# Patient Record
Sex: FEMALE | Race: ASIAN | NOT HISPANIC OR LATINO | URBAN - METROPOLITAN AREA
[De-identification: names, ages, dates, MRNs, and addresses within clinical notes are randomized per-mention and may not be internally consistent; named-entity substitution may affect disease eponyms.]

---

## 2023-10-27 ENCOUNTER — EMERGENCY (EMERGENCY)
Facility: HOSPITAL | Age: 33
LOS: 1 days | Discharge: HOME | End: 2023-10-27
Attending: STUDENT IN AN ORGANIZED HEALTH CARE EDUCATION/TRAINING PROGRAM | Admitting: STUDENT IN AN ORGANIZED HEALTH CARE EDUCATION/TRAINING PROGRAM
Payer: COMMERCIAL

## 2023-10-27 ENCOUNTER — EMERGENCY (EMERGENCY)
Facility: HOSPITAL | Age: 33
LOS: 1 days | Discharge: HOME | End: 2023-10-27
Admitting: EMERGENCY MEDICINE
Payer: COMMERCIAL

## 2023-10-27 VITALS
SYSTOLIC BLOOD PRESSURE: 96 MMHG | HEART RATE: 74 BPM | RESPIRATION RATE: 17 BRPM | DIASTOLIC BLOOD PRESSURE: 60 MMHG | TEMPERATURE: 99 F | HEIGHT: 63 IN | WEIGHT: 117.95 LBS | OXYGEN SATURATION: 95 %

## 2023-10-27 VITALS
DIASTOLIC BLOOD PRESSURE: 57 MMHG | HEART RATE: 57 BPM | OXYGEN SATURATION: 100 % | WEIGHT: 117.95 LBS | SYSTOLIC BLOOD PRESSURE: 92 MMHG | TEMPERATURE: 98 F | RESPIRATION RATE: 18 BRPM | HEIGHT: 63 IN

## 2023-10-27 DIAGNOSIS — R09.A2 FOREIGN BODY SENSATION, THROAT: ICD-10-CM

## 2023-10-27 PROCEDURE — 99284 EMERGENCY DEPT VISIT MOD MDM: CPT

## 2023-10-27 PROCEDURE — 99283 EMERGENCY DEPT VISIT LOW MDM: CPT

## 2023-10-27 RX ORDER — KETOROLAC TROMETHAMINE 30 MG/ML
30 SYRINGE (ML) INJECTION ONCE
Refills: 0 | Status: DISCONTINUED | OUTPATIENT
Start: 2023-10-27 | End: 2023-10-27

## 2023-10-27 RX ORDER — DEXAMETHASONE 0.5 MG/5ML
6 ELIXIR ORAL ONCE
Refills: 0 | Status: COMPLETED | OUTPATIENT
Start: 2023-10-27 | End: 2023-10-27

## 2023-10-27 RX ORDER — DEXAMETHASONE 0.5 MG/5ML
6 ELIXIR ORAL ONCE
Refills: 0 | Status: DISCONTINUED | OUTPATIENT
Start: 2023-10-27 | End: 2023-10-27

## 2023-10-27 NOTE — ED PROVIDER NOTE - PHYSICAL EXAMINATION
VITAL SIGNS: I have reviewed nursing notes and confirm.  CONSTITUTIONAL: Well-developed; well-nourished; in no acute distress.  SKIN: Skin is warm and dry, no acute rash.  HEAD: Normocephalic; atraumatic.  NECK: Supple; non tender.  ENT: Clear oropharynx; no posterior erythema or obvious foreign body/obstruction.  Normal appearance of tonsils and uvula midline.  CARD: S1, S2 normal; no murmurs, gallops, or rubs. Regular rate and rhythm.  RESP: No wheezes, rales or rhonchi.  EXT: Normal ROM. No clubbing, cyanosis or edema.  NEURO: Alert, oriented. Grossly unremarkable. SOLITARIO, normal tone, no gross motor or sensory changes. Fluent speech.   PSYCH: Cooperative, appropriate. Mood and affect wnl.

## 2023-10-27 NOTE — ED ADULT NURSE NOTE - OBJECTIVE STATEMENT
Pt reports globus/foreign body sensation in throat exacerbated when swallowing. Pt able to handle secretions and airway without incident. No respiratory distress noted. No fevers or chills noted. No chest pain/SOB reported. Pt alert, oriented, and ambulatory at time of assessment. Pt able to speak in full sentences without difficulty.

## 2023-10-27 NOTE — ED ADULT TRIAGE NOTE - BSA (M2)
CONSERVATIVE TREATMENT FOR PEDIATRIC URI (VIRAL):   PLEASE DOUBLE CHECK WITH PEDIATRICIAN TO ENSURE THAT ALL BELOW SUGGESTING MEDICATIONS OR SAFE FOR YOUR CHILD.  REFER TO MEDICATION LABELING FOR CORRECT DOSAGE    Using a humidifier and propping your child up will help him/her with symptom relief.     You can give Children's Zyrtec or children's Claritin or Children's Benadryl or Childrens xyzal once daily to help with cough and runny nose.    You can give Children's Mucinex or Children's Robitussin or Children's Delsym for cough and chest congestion.     Your child can use Flonase or nasal saline spray to clear nasal drainage and help with nasal congestion.     You can place a thin layer of Vicks vapor rub of the the soles of the feet and place on socks to help with congestion.  You can also apply a little over the chest.  Please avoid placing Vicks on the face as it is too strong for your child's facial area.    Monitor your child's temperature and ALTERNATE Tylenol every 4 hours and/or Ibuprofen (Motrin) every 6-8 hours as needed for fever (100.4F or greater), headache and/or body aches.     Make sure your child is drinking plenty fluids and getting plenty of rest.    You should follow-up with your child's pediatrician.    Go to the ER if your child's fever is not controlled with Tylenol and/or Ibuprofen, or for any further worsening or concerning symptoms such as but not limited to:  Not making urine, not able to make with ears, or severe inconsolability.          1.55

## 2023-10-27 NOTE — ED PROVIDER NOTE - CLINICAL SUMMARY MEDICAL DECISION MAKING FREE TEXT BOX
33-year-old female, denies significant past medical history, presenting to the emergency room complaining of foreign body sensation in the back of her throat after eating bread earlier today. Upon my examination there is no obvious abnormality noted.  Clinically I suspect patient is experiencing a globus sensation, likely due to irritation in the back of the throat when a piece of seed passed to the area.  I cannot fully evaluate the back of the patient's throat; she will require a laryngoscope in order to definitively rule out a foreign body. I explained to patient I can transfer her up to [facility] for her to get an ENT evaluation at this time.  Patient is agreeable but prefers to take a cab there herself.  She is currently stable at this time with stable vitals from triage.  She is offered Decadron and Toradol for symptomatic relief, however patient prefers to wait until she gets there before receiving it.  She is otherwise stable and well-appearing.  She is safe for discharge and will head to the [facility] now for further evaluation.

## 2023-10-27 NOTE — ED PROVIDER NOTE - PATIENT PORTAL LINK FT
You can access the FollowMyHealth Patient Portal offered by [facility] by registering at the following website: [email or web address]. By joining [employer]’s FollowMyHealth portal, you will also be able to view your health information using other applications (apps) compatible with our system.

## 2023-10-27 NOTE — ED PROVIDER NOTE - CARE PROVIDER_API CALL
Porsche Thomas  Otolaryngology  [address] [place] [address], [address]  [place] [address]  Phone: [phone]  Fax: [phone]  Follow Up Time: Urgent

## 2023-10-27 NOTE — ED PROVIDER NOTE - PATIENT PORTAL LINK FT
You can access the FollowMyHealth Patient Portal offered by [facility] by registering at the following website: [email or web address]. By joining [employer]’s FollowMyHealth portal, you will also be able to view your health information using other applications (apps) compatible with our system.

## 2023-10-27 NOTE — ED PROVIDER NOTE - NSFOLLOWUPINSTRUCTIONS_ED_ALL_ED_FT
You were seen in the Emergency Department for: foreign body sensation    Please follow up with your primary physician. If you do not have a primary physician or specialist of your needs, please call [phone] to find one convenient for you. At this number you will be able to locate a provider who accepts your insurance, as well as locate the right specialist for your needs.    You should return to the Emergency Department if you feel any new/worsening/persistent symptoms including but not limited to: chest pain, difficulty breathing, loss of consciousness, bleeding, uncontrolled pain, numbness/weakness of a body part

## 2023-10-27 NOTE — ED PROVIDER NOTE - NS ED ROS FT
+throat FB sensation  Denies fevers, chills, nausea, vomiting, diarrhea, constipation, abdominal pain, urinary symptoms, chest pain, palpitations, shortness of breath, dyspnea on exertion, syncope/near syncope, cough/URI symptoms, headache, weakness, numbness, focal deficits, visual changes, gait or balance changes, dizziness

## 2023-10-27 NOTE — ED PROVIDER NOTE - NSFOLLOWUPINSTRUCTIONS_ED_ALL_ED_FT
Globus Pharyngeus  Globus pharyngeus is a condition that makes a person feel like there is a lump in their throat. It may also feel like there is something stuck in the front of the throat. It does not cause pain. It also does not make it harder to swallow food or liquid. Your health care provider may not see any changes to your throat during an exam.    Globus pharyngeus may come and go but may last a long time when it occurs. In most cases, it does not require treatment.    It may be caused by:  Gastroesophageal reflux. This is when juices from the stomach flow back up into the throat.  Neuralgia. This is irritation of the nerves that help you swallow.  Anxiety, grief, or depression.  Irritation from cigarettes, alcohol, or drinks with caffeine.  Your health care provider will do exams and tests to look for a cause and to rule out other problems.    Follow these instructions at home:  A person practicing relaxation techniques.  Watch for any changes. To help with your condition:  Take over-the-counter and prescription medicines only as told by your health care provider.  Follow instructions from your health care provider about what you may eat and drink. You may need to reduce the amount of alcohol and caffeine that you take in.  Avoid stress. Do relaxation exercises as told by your health care provider.  Do not use any products that contain nicotine or tobacco. These products include cigarettes, chewing tobacco, and vaping devices, such as e-cigarettes. If you need help quitting, ask your health care provider.  It is up to you to get the results of any tests that were done. Ask your health care provider, or the department that is doing the tests, when your results will be ready.  Contact a health care provider if:  Your symptoms get worse.  You have throat pain.  It is hard to swallow.  Food or liquid comes back up into your mouth.  You lose weight without trying.  Get help right away if:  Your throat swells.  This information is not intended to replace advice given to you by your health care provider. Make sure you discuss any questions you have with your health care provider.

## 2023-10-27 NOTE — ED ADULT NURSE NOTE - NS ED NURSE LEVEL OF CONSCIOUSNESS SPEECH
Spoke with patient, reviewed culture results and Dr Jimenez's recommendations. Patient understands.    Speaking Coherently

## 2023-10-27 NOTE — ED ADULT NURSE NOTE - OBJECTIVE STATEMENT
34 y/o F here with "small sprouted seed got stuck in my throat after eating bread". She states she is unable to remove it due to gag reflex. NKA. A&Ox4. Ambulatory. No N/V/D

## 2023-10-27 NOTE — ED ADULT NURSE NOTE - CHIEF COMPLAINT QUOTE
Pt presents to ED fro foreign body in the throat. Pt was seen at [facility]. Pt reports she ate a bread at 1400. Pt A&Ox4, conversive in full sentences and denies any difficulty swallowing and no drooling noted. No signs of distress in triage.

## 2023-10-27 NOTE — ED PROVIDER NOTE - CLINICAL SUMMARY MEDICAL DECISION MAKING FREE TEXT BOX
33 year old otherwise healthy female presenting for foreign body sensation in throat x 1d. Overall is nontoxic here, protecting her airway and tolerating secretions with ease. Lungs/oropharynx clear on my exam. Will consult ENT to further evaluate.

## 2023-10-27 NOTE — ED PROVIDER NOTE - PHYSICAL EXAMINATION
Gen - NAD; well-appearing, speaking in normal voice; A+Ox3   HEENT - NCAT, EOMI, moist mucous membranes, clear oropharynx without appreciable foreign body in posterior oropharynx, midline uvula, no exudates/lesions  Neck - supple  Resp - CTAB, no increased WOB  CV -  RRR, no m/r/g  Abd - soft, NT, ND; no guarding or rebound  MSK - FROM of b/l UE and LE, no gross deformities  Extrem - no LE edema/erythema/tenderness  Neuro - no focal motor or sensation deficits  Skin - warm, well perfused

## 2023-10-27 NOTE — CONSULT NOTE ADULT - ASSESSMENT
-------------------------------  ASSESSMENT/PLAN:    IMPRESSION: TATYANA JEAN-BAPTISTE  is a 33y Female with no significant PMH who presents with foreign body sensation after eating bread. She is AFVSS, tolerating secretions, with no foreign body visualized on physical exam or laryngoscopy. Small scratch visualized on anterior tonsillar pillar, possible etiology of discomfort.     RECOMMENDATIONS:  - Famotidine once a day at night for discomfort  - Can follow up with Dr. Thomas next week for further evaluation    ---  Thank you for the kind referral and for allowing me to share in the care of TATYANA JEAN-BAPTISTE If you have any questions, please do not hesitate to contact me.     Sincerely,  Gloria Alvarez  10-27-23 @ 21:24       -------------------------------  ASSESSMENT/PLAN:    IMPRESSION: TATYANA JEAN-BAPTISTE  is a 33y Female with no significant PMH who presents with foreign body sensation after eating bread. She is AFVSS, tolerating secretions, without respiratory distress and with no foreign body visualized on physical exam or laryngoscopy. Small scratch visualized on anterior tonsillar pillar, possible etiology of discomfort.     RECOMMENDATIONS:  - Famotidine once a day at night for discomfort  - Can follow up with Dr. Thomas next week for further evaluation  - Discharge per emergency department    ---  Thank you for the kind referral and for allowing me to share in the care of TATYANA JEAN-BAPTISTE If you have any questions, please do not hesitate to contact me.     Sincerely,  Gloria Alvarez  10-27-23 @ 21:24

## 2023-10-27 NOTE — ED PROVIDER NOTE - PROGRESS NOTE DETAILS
Leo Wilson MD: Patient seen by ENT--no foreign body appreciated. Does not need imaging here, can f/u outpatient. Patient stable--tolerating her secretions, protecting airway, no respiratory complaints. Will DC with outpatient f/u.

## 2023-10-27 NOTE — ED ADULT NURSE NOTE - CAS ELECT INFOMATION PROVIDED
Pt left prior to accepting discharge paperwork. Pt refused final vital signs. Pt alert, oriented, ambulatory, and handling airway/secretions without incident at time of departure.

## 2023-10-27 NOTE — ED ADULT TRIAGE NOTE - CHIEF COMPLAINT QUOTE
Pt presents to ED fro foreign body in the throat. Pt reports she ate a bread at 1400. Pt A&Ox4, conversive in full sentences and denies any difficulty swallowing and no drooling noted. No signs of distress in triage. Pt presents to ED fro foreign body in the throat. Pt was seen at [facility]. Pt reports she ate a bread at 1400. Pt A&Ox4, conversive in full sentences and denies any difficulty swallowing and no drooling noted. No signs of distress in triage.

## 2023-10-27 NOTE — ED PROVIDER NOTE - CARE PLAN
1 Patient given discharge instructions and they have confirmed that they 
understand the instructions.  All patient belongings gathered and taken with 
patient. Patient ambulatory with steady gait from ED. Principal Discharge DX:	Foreign body sensation, throat

## 2023-10-27 NOTE — CONSULT NOTE ADULT - SUBJECTIVE AND OBJECTIVE BOX
OTOLARYNGOLOGY (ENT) CONSULTATION NOTE    PATIENT: TATYANA JEAN-BAPTISTE     MRN: [id number]       : 89  DATE OF ADMISSION:10-27-23  DATE OF SERVICE:  10-27-23 @ 21:24    CHIEF COMPLAINT: foreign body in throat    HISTORY OF PRESENT ILLNESS:  TATYANA JEAN-BAPTISTE  is a 33y Female who presents with complaints of foreign body in throat. She reports that she was eating bread around 2pm today when she started to feel something stuck in her throat. She reports feeling it in the back on the right side, near the tonsil. She was evaluated at [facility] earlier today and was discharged at that time with no foreign body visualized. She presents to [facility] ED as the sensation has not stopped. She denies difficulty swallowing, tolerating her own secretions, difficulty tolerating her diet. She denies difficulty breathing, fevers, chills.     PAST MEDICAL HISTORY:  No pertinent past medical history        CURRENT MEDICATIONS       HOME MEDICATIONS:      ALLERGIES:  No Known Allergies    SOCIAL HISTORY: Pertinent included in HPI   FAMILY HISTORY      SURGICAL HISTORY:  No significant past surgical history        REVIEW OF SYSTEMS: The patient was asked and responded to a review of systems regarding the following symptoms: constitutional, eye, ears, nose, mouth, throat, cardiovascular, respiratory. Pertinent factors have been included in the HPI.       General: NAD, A+Ox3  Respiratory: No respiratory distress, stridor, or stertor  Voice quality: normal  Face:  Symmetric without masses or lesions  OU: EOMI  Right: Pinna wnl  Left: Pinna wnl  Nose: nasal cavity clear bilaterally, inferior turbinates normal  OC/OP: tongue normal, floor of mouth WNL, no masses or lesions, OP clear, small pinpoint scratch on anterior tonsillar pillar, no foreign body visaulized  Neck: soft/flat, no LAD      LARYNGOSCOPY EXAM:     Verbal consent was obtained from patient prior to procedure.    Flexible laryngoscopy was performed and revealed the following:    Nasopharynx had no mass or exudate.    Base of tongue was symmetric and not enlarged.    Vallecula was clear    Epiglottis, both aryepiglottic folds and both false vocal folds were normal    Arytenoids both without edema and erythema     True vocal folds were fully mobile and without lesions.     Post cricoid area was clear    Interarytenoid edema was absent    No foreign body visualized    The patient tolerated the procedure well.        Vital Signs:  T(C): 36.7 (10-27-23 @ 19:50), Max: 37.1 (10-27-23 @ 16:11)  HR: 57 (10-27-23 @ 19:50) (57 - 74)  BP: 92/57 (10-27-23 @ 19:50) (92/57 - 96/60)  RR: 18 (10-27-23 @ 19:50) (17 - 18)  SpO2: 100% (10-27-23 @ 19:50) (95% - 100%)        [id number]    MICROBIOLOGY:      PATHOLOGY:

## 2023-10-27 NOTE — ED PROVIDER NOTE - OBJECTIVE STATEMENT
33-year-old female, denies significant past medical history, presenting to the emergency room complaining of foreign body sensation in the back of her throat after eating bread earlier today.  She reports eating whole-wheat bread with sprouted seeds on the top, earlier today.  After chewing and swallowing, she subsequently felt the sensation as though a small seed might of become lodged in the back of her throat.  Patient has been able to swallow like normal and denies any coughing, choking, or shortness of breath.  She further denies nausea, vomiting, fevers, chills, chest pain, or dizziness.

## 2023-10-27 NOTE — ED ADULT NURSE NOTE - NSFALLUNIVINTERV_ED_ALL_ED
Bed/Stretcher in lowest position, wheels locked, appropriate side rails in place/Call bell, personal items and telephone in reach/Instruct patient to call for assistance before getting out of bed/chair/stretcher/Non-slip footwear applied when patient is off stretcher/[place] to call system/Physically safe environment - no spills, clutter or unnecessary equipment/Purposeful proactive rounding/Room/bathroom lighting operational, light cord in reach

## 2023-10-31 DIAGNOSIS — R09.A2 FOREIGN BODY SENSATION, THROAT: ICD-10-CM

## 2024-01-18 NOTE — ED ADULT NURSE NOTE - SUICIDE SCREENING QUESTION 3
Pharmacy faxed in a request for prior authorization on:    Medication: oxybutynin  Dosage: 10 mg  Quantity requested:  90  Pharmacy for prescription has been selected.    Initiation of prior authorization needed.  Prior authorization request has been initiated and sent for completion.    No

## 2024-09-04 ENCOUNTER — EMERGENCY (EMERGENCY)
Facility: HOSPITAL | Age: 34
LOS: 1 days | Discharge: HOME | End: 2024-09-04
Admitting: EMERGENCY MEDICINE
Payer: COMMERCIAL

## 2024-09-04 VITALS
TEMPERATURE: 98 F | HEIGHT: 65 IN | WEIGHT: 134.48 LBS | RESPIRATION RATE: 15 BRPM | OXYGEN SATURATION: 97 % | HEART RATE: 66 BPM | SYSTOLIC BLOOD PRESSURE: 109 MMHG | DIASTOLIC BLOOD PRESSURE: 71 MMHG

## 2024-09-04 DIAGNOSIS — S01.111A LACERATION WITHOUT FOREIGN BODY OF RIGHT EYELID AND PERIOCULAR AREA, INITIAL ENCOUNTER: ICD-10-CM
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
## 2024-09-04 DIAGNOSIS — Z23 ENCOUNTER FOR IMMUNIZATION: ICD-10-CM
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
## 2024-09-04 DIAGNOSIS — R11.0 NAUSEA: ICD-10-CM

## 2024-09-04 PROCEDURE — 99284 EMERGENCY DEPT VISIT MOD MDM: CPT

## 2024-09-04 RX ORDER — BACITRACIN 500 UNIT/G
1 OINTMENT (GRAM) TOPICAL ONCE
Refills: 0 | Status: ACTIVE | OUTPATIENT
Start: 2024-09-04 | End: 2024-09-04

## 2024-09-04 RX ORDER — TETANUS TOXOID, REDUCED DIPHTHERIA TOXOID AND ACELLULAR PERTUSSIS VACCINE, ADSORBED 5; 2.5; 8; 8; 2.5 [IU]/.5ML; [IU]/.5ML; UG/.5ML; UG/.5ML; UG/.5ML
0.5 SUSPENSION INTRAMUSCULAR ONCE
Refills: 0 | Status: COMPLETED | OUTPATIENT
Start: 2024-09-04 | End: 2024-09-04

## 2024-09-04 RX ADMIN — TETANUS TOXOID, REDUCED DIPHTHERIA TOXOID AND ACELLULAR PERTUSSIS VACCINE, ADSORBED 0.5 MILLILITER(S): 5; 2.5; 8; 8; 2.5 SUSPENSION INTRAMUSCULAR at 14:27

## 2024-09-04 NOTE — ED ADULT TRIAGE NOTE - HEIGHT IN CM
- 3/17 CT Facial bones: Worsening of nasal bone fractures after prior treatment per plastic surgery  - Plastic surgery consult for evaluation after new fall and worsening fractures 165.1

## 2024-09-04 NOTE — ED ADULT TRIAGE NOTE - CHIEF COMPLAINT QUOTE
patient BIBA from street s/p trip and fall; stumbled over curb and hit head against building; laceration to right eyebrow; unknown last tetanus; no falls, no use of blood thinners

## 2024-09-04 NOTE — ED PROVIDER NOTE - NSFOLLOWUPINSTRUCTIONS_ED_ALL_ED_FT
Keep wounds clean and dry.  Please follow-up with Dr. Leblanc on Tuesday.  Return to the emergency room for any concerns.

## 2024-09-04 NOTE — ED PROVIDER NOTE - PATIENT PORTAL LINK FT
You can access the FollowMyHealth Patient Portal offered by [facility] by registering at the following website: [email or web address]. By joining [employer]’s FollowMyHealth portal, you will also be able to view your health information using other applications (apps) compatible with our system.

## 2024-09-04 NOTE — ED ADULT TRIAGE NOTE - IDEAL BODY WEIGHT(KG)
[No Acute Distress] : no acute distress [Normal Oropharynx] : normal oropharynx [Normal Appearance] : normal appearance [No Neck Mass] : no neck mass [Normal Rate/Rhythm] : normal rate/rhythm [Normal S1, S2] : normal s1, s2 [No Murmurs] : no murmurs [No Resp Distress] : no resp distress [No Abnormalities] : no abnormalities [Benign] : benign [No HSM] : no hsm [Normal Gait] : normal gait [No Cyanosis] : no cyanosis [No Edema] : no edema [Normal Turgor] : normal turgor [No Focal Deficits] : no focal deficits [Oriented x3] : oriented x3 [Normal Affect] : normal affect [TextBox_[id number]] : few rhonchi no wheezing 57

## 2024-09-04 NOTE — ED PROVIDER NOTE - CLINICAL SUMMARY MEDICAL DECISION MAKING FREE TEXT BOX
32 yo F with no PMH c/o right eyebrow laceration. Pt was walking on the sidewalk with a friend and tripped on the sidewalk, She reports falling sideways and into a building.  Pt reports nausea x 1 hour following the incidence that has resolved. Pt denies LOC but felt "woozy" after the incident. Pt is unsure of last tetanus shot.   Pt denies dizziness, HA, visual differences, numbness or tingling, weakness, seizure, tinnitus, vertigo, chest pain, SOB,      no neuro/motor deficits, abrasions cleansed, tetanus updated, lac repaired by plastics on call Dr. Leblanc. follow up outpatient with Dr. Leblanc  no indication for imaging of brain

## 2024-09-04 NOTE — ED PROVIDER NOTE - OBJECTIVE STATEMENT
32 yo F with no PMH presents with head injury. Pt was walking on the sidewalk with a friend and tripped on her feet and the sidewalk, She reports falling sideways and into a building. Pt was then brought in by ambulance. Pt is reporting pain under the right eyebrow with associated laceration in the area. Pt reports some L knee pain in area of associated abrasion. Pt reports nausea x 1 hour following the incidence that has resolved. Pt denies LOC but felt "woozy" after the incident. Pt is unsure of last tetanus shot.   Pt denies dizziness, visual defects, numbness or tingling, weakness, seizure, tinnitus, vertigo, chest pain, SOB, 32 yo F with no PMH c/o right eyebrow laceration. Pt was walking on the sidewalk with a friend and tripped on the sidewalk, She reports falling sideways and into a building  Pt reports nausea x 1 hour following the incidence that has resolved. Pt denies LOC but felt "woozy" after the incident. Pt is unsure of last tetanus shot.   Pt denies dizziness, HA, visual differences, numbness or tingling, weakness, seizure, tinnitus, vertigo, chest pain, SOB,

## 2024-09-04 NOTE — ED ADULT TRIAGE NOTE - INTERNATIONAL TRAVEL
No
DAMIAN MICHELLE is an 8 YEAR OLD MALE who presents to ER for CC of Fever and Vomiting (yest - none today). Rhinorrhea, Cough, Abd Pain, Sore Throat. Maternal Grandmother Flu +. VSS. PE above. Suspect Viral Illness, likely Flu. Supportive Care. Send Zofran to Pharmacy. PMD F/U and strict ER return precautions. Patient is stable, in no apparent distress, non-toxic appearing, tolerating PO, no neurologic deficits, and is cleared for discharge to home. Jaxson Alan PA-C

## 2024-09-04 NOTE — ED PROVIDER NOTE - CARE PROVIDER_API CALL
Krish Leblanc  Plastic Surgery  [address] [place] [id number]  Phone: [phone]  Fax: [phone]  Follow Up Time:

## 2024-09-04 NOTE — ED PROVIDER NOTE - PHYSICAL EXAMINATION
CONSTITUTIONAL: Well-appearing; well-nourished; in no apparent distress.   	HEAD: Normocephalic; 2cm laceration superficial to right eyebrow, no active bleeding, no foreign body. No facial bone tenderness.   	EYES:  conjunctiva and sclera clear. EOMs intact, normal.   	ENT: normal nose; no rhinorrhea; normal pharynx with no erythema or lesions.   	GI: Soft; non-distended; non-tender  	EXT: No cyanosis or edema; N/V intact.   	SKIN: Normal for age and race; warm; dry; good turgor; Abrasions to b/l palms, b/l knees and L elbow.    	NEURO: A & O x 3; face symmetric; grossly unremarkable. UE and LE strength 5/5 b/l. Normal ambulation and gait.   PSYCHOLOGICAL: The patient’s mood and manner are appropriate. CONSTITUTIONAL: Well-appearing; well-nourished; in no apparent distress.   	HEAD: Normocephalic; 2cm laceration superficial to right eyebrow, no active bleeding, no foreign body. No facial bone tenderness.   Neck: normal appearance, no midline tenderness, no bony stepoffs.  	EYES:  conjunctiva and sclera clear. EOMs intact, normal.   	ENT: normal nose; no rhinorrhea; normal pharynx with no erythema or lesions.   	EXT: SOLITARIO x 4. normal gait.   	SKIN: Normal for age and race; warm; dry; good turgor; Abrasions to b/l palms, b/l knees and L elbow.  no bony tenderness to extremities. good ROM joints. dpi.   	NEURO: A & O x 3; face symmetric; grossly unremarkable. UE and LE strength 5/5 b/l. Normal ambulation and gait.   PSYCHOLOGICAL: The patient’s mood and manner are appropriate.